# Patient Record
Sex: FEMALE | Race: WHITE | NOT HISPANIC OR LATINO | Employment: FULL TIME | URBAN - NONMETROPOLITAN AREA
[De-identification: names, ages, dates, MRNs, and addresses within clinical notes are randomized per-mention and may not be internally consistent; named-entity substitution may affect disease eponyms.]

---

## 2020-11-06 ENCOUNTER — OFFICE VISIT (OUTPATIENT)
Dept: URGENT CARE | Facility: PHYSICIAN GROUP | Age: 20
End: 2020-11-06
Payer: COMMERCIAL

## 2020-11-06 VITALS
RESPIRATION RATE: 16 BRPM | BODY MASS INDEX: 20.14 KG/M2 | HEIGHT: 64 IN | WEIGHT: 118 LBS | HEART RATE: 88 BPM | DIASTOLIC BLOOD PRESSURE: 70 MMHG | SYSTOLIC BLOOD PRESSURE: 126 MMHG | TEMPERATURE: 98.4 F | OXYGEN SATURATION: 97 %

## 2020-11-06 DIAGNOSIS — J01.40 ACUTE NON-RECURRENT PANSINUSITIS: ICD-10-CM

## 2020-11-06 PROCEDURE — 99204 OFFICE O/P NEW MOD 45 MIN: CPT | Performed by: PHYSICIAN ASSISTANT

## 2020-11-06 RX ORDER — AMOXICILLIN AND CLAVULANATE POTASSIUM 875; 125 MG/1; MG/1
1 TABLET, FILM COATED ORAL 2 TIMES DAILY
Qty: 14 TAB | Refills: 0 | Status: SHIPPED | OUTPATIENT
Start: 2020-11-06 | End: 2020-11-13

## 2020-11-06 SDOH — HEALTH STABILITY: MENTAL HEALTH: HOW OFTEN DO YOU HAVE A DRINK CONTAINING ALCOHOL?: NEVER

## 2020-11-06 ASSESSMENT — ENCOUNTER SYMPTOMS
PALPITATIONS: 0
MYALGIAS: 0
COUGH: 1
FEVER: 0
HEADACHES: 0
SHORTNESS OF BREATH: 0
WHEEZING: 0
SORE THROAT: 1
SPUTUM PRODUCTION: 1
SINUS PAIN: 1
CHILLS: 0

## 2020-11-06 NOTE — PROGRESS NOTES
"  Subjective:   Freya Parish is a 20 y.o. female who presents today with   Chief Complaint   Patient presents with   • Cough     x 2 wee  , congestion , sore throat    Patient's mother is present today.  Cough  This is a new problem. The current episode started 1 to 4 weeks ago. The problem has been unchanged. The problem occurs constantly. The cough is productive of sputum. Associated symptoms include nasal congestion and a sore throat. Pertinent negatives include no chest pain, chills, ear pain, fever, headaches, myalgias, shortness of breath or wheezing. Nothing aggravates the symptoms. Treatments tried: Mucinex. The treatment provided mild relief.   No known COVID exposure.  I personally donned proper PPE throughout visit today.     PMH:  has no past medical history on file.  MEDS:   Current Outpatient Medications:   •  amoxicillin-clavulanate (AUGMENTIN) 875-125 MG Tab, Take 1 Tab by mouth 2 times a day for 7 days., Disp: 14 Tab, Rfl: 0  ALLERGIES: No Known Allergies  SURGHX: No past surgical history on file.  SOCHX:  reports that she has never smoked. She has never used smokeless tobacco. She reports that she does not drink alcohol or use drugs.  FH: Reviewed with patient, not pertinent to this visit.     Review of Systems   Constitutional: Negative for chills, fever and malaise/fatigue.   HENT: Positive for congestion, sinus pain and sore throat. Negative for ear pain.    Respiratory: Positive for cough and sputum production. Negative for shortness of breath and wheezing.    Cardiovascular: Negative for chest pain and palpitations.   Musculoskeletal: Negative for myalgias.   Neurological: Negative for headaches.   All other systems reviewed and are negative.     Objective:   /70   Pulse 88   Temp 36.9 °C (98.4 °F) (Temporal)   Resp 16   Ht 1.626 m (5' 4\")   Wt 53.5 kg (118 lb)   LMP 11/30/2019   SpO2 97%   BMI 20.25 kg/m²   Physical Exam  Vitals signs and nursing note reviewed. "   Constitutional:       General: She is not in acute distress.     Appearance: Normal appearance. She is well-developed. She is not ill-appearing or toxic-appearing.   HENT:      Head: Normocephalic and atraumatic.      Right Ear: Hearing, tympanic membrane and ear canal normal.      Left Ear: Hearing, tympanic membrane and ear canal normal.      Nose: Congestion present.      Right Sinus: Maxillary sinus tenderness and frontal sinus tenderness present.      Left Sinus: Maxillary sinus tenderness and frontal sinus tenderness present.      Mouth/Throat:      Mouth: Mucous membranes are moist.      Pharynx: No oropharyngeal exudate or posterior oropharyngeal erythema.   Eyes:      Conjunctiva/sclera: Conjunctivae normal.   Cardiovascular:      Rate and Rhythm: Normal rate and regular rhythm.      Heart sounds: Normal heart sounds.   Pulmonary:      Effort: Pulmonary effort is normal.      Breath sounds: Normal breath sounds. No wheezing, rhonchi or rales.      Comments: Congested cough on exam  Musculoskeletal:      Comments: Normal movement in all 4 extremities   Skin:     General: Skin is warm and dry.   Neurological:      Mental Status: She is alert.      Coordination: Coordination normal.   Psychiatric:         Mood and Affect: Mood normal.       Assessment/Plan:   Assessment    1. Acute non-recurrent pansinusitis  - amoxicillin-clavulanate (AUGMENTIN) 875-125 MG Tab; Take 1 Tab by mouth 2 times a day for 7 days.  Dispense: 14 Tab; Refill: 0  Offered Covid test but patient's mother declines today.  Discussed CDC guidelines including any new fevers or chills that would definitely require COVID testing at that time.  Encourage sinus rinse and continued use of Mucinex.  Discussed that we cannot rule out Covid without testing today but they would like to not have testing done.  Differential diagnosis, natural history, supportive care, and indications for immediate follow-up discussed.   Patient given instructions and  understanding of medications and treatment.    If not improving in 3-5 days, F/U with PCP or return to UC if symptoms worsen.    Patient agreeable to plan.        Please note that this dictation was created using voice recognition software. I have made every reasonable attempt to correct obvious errors, but I expect that there are errors of grammar and possibly content that I did not discover before finalizing the note.    Mickey Aparicio PA-C

## 2020-11-06 NOTE — LETTER
November 6, 2020         Patient: Freya Parish   YOB: 2000   Date of Visit: 11/6/2020           To Whom it May Concern:    Freya Parish was seen in my clinic on 11/6/2020.  Patient was started on an antibiotic today for sinus infection.  Discussed CDC guidelines with any new symptoms including but not limited to any fevers or chills she should return for Covid testing at that time.    If you have any questions or concerns, please don't hesitate to call.        Sincerely,           Mickey Aparicio P.A.-C.  Electronically Signed

## 2023-03-08 ENCOUNTER — HOSPITAL ENCOUNTER (OUTPATIENT)
Dept: LAB | Facility: MEDICAL CENTER | Age: 23
End: 2023-03-08
Attending: STUDENT IN AN ORGANIZED HEALTH CARE EDUCATION/TRAINING PROGRAM
Payer: COMMERCIAL

## 2023-03-08 ENCOUNTER — OFFICE VISIT (OUTPATIENT)
Dept: MEDICAL GROUP | Facility: PHYSICIAN GROUP | Age: 23
End: 2023-03-08
Payer: COMMERCIAL

## 2023-03-08 ENCOUNTER — HOSPITAL ENCOUNTER (OUTPATIENT)
Dept: RADIOLOGY | Facility: MEDICAL CENTER | Age: 23
End: 2023-03-08
Attending: STUDENT IN AN ORGANIZED HEALTH CARE EDUCATION/TRAINING PROGRAM
Payer: COMMERCIAL

## 2023-03-08 VITALS
HEIGHT: 64 IN | SYSTOLIC BLOOD PRESSURE: 102 MMHG | DIASTOLIC BLOOD PRESSURE: 70 MMHG | OXYGEN SATURATION: 100 % | HEART RATE: 102 BPM | WEIGHT: 122 LBS | BODY MASS INDEX: 20.83 KG/M2 | RESPIRATION RATE: 12 BRPM | TEMPERATURE: 98.7 F

## 2023-03-08 DIAGNOSIS — F32.2 CURRENT SEVERE EPISODE OF MAJOR DEPRESSIVE DISORDER WITHOUT PSYCHOTIC FEATURES WITHOUT PRIOR EPISODE (HCC): ICD-10-CM

## 2023-03-08 DIAGNOSIS — R11.2 NAUSEA AND VOMITING, UNSPECIFIED VOMITING TYPE: ICD-10-CM

## 2023-03-08 DIAGNOSIS — R10.11 RUQ PAIN: ICD-10-CM

## 2023-03-08 DIAGNOSIS — Z76.89 ENCOUNTER TO ESTABLISH CARE: ICD-10-CM

## 2023-03-08 DIAGNOSIS — Z23 NEED FOR VACCINATION: ICD-10-CM

## 2023-03-08 DIAGNOSIS — R23.3 PETECHIAE: ICD-10-CM

## 2023-03-08 LAB — TSH SERPL DL<=0.005 MIU/L-ACNC: 0.79 UIU/ML (ref 0.38–5.33)

## 2023-03-08 PROCEDURE — 84443 ASSAY THYROID STIM HORMONE: CPT

## 2023-03-08 PROCEDURE — 36415 COLL VENOUS BLD VENIPUNCTURE: CPT

## 2023-03-08 PROCEDURE — 90471 IMMUNIZATION ADMIN: CPT | Performed by: STUDENT IN AN ORGANIZED HEALTH CARE EDUCATION/TRAINING PROGRAM

## 2023-03-08 PROCEDURE — 76705 ECHO EXAM OF ABDOMEN: CPT

## 2023-03-08 PROCEDURE — 99204 OFFICE O/P NEW MOD 45 MIN: CPT | Mod: 25 | Performed by: STUDENT IN AN ORGANIZED HEALTH CARE EDUCATION/TRAINING PROGRAM

## 2023-03-08 PROCEDURE — 90715 TDAP VACCINE 7 YRS/> IM: CPT | Performed by: STUDENT IN AN ORGANIZED HEALTH CARE EDUCATION/TRAINING PROGRAM

## 2023-03-08 RX ORDER — ONDANSETRON 4 MG/1
4 TABLET, ORALLY DISINTEGRATING ORAL EVERY 6 HOURS PRN
Qty: 15 TABLET | Refills: 0 | Status: SHIPPED | OUTPATIENT
Start: 2023-03-08 | End: 2023-04-12

## 2023-03-08 RX ORDER — OMEPRAZOLE 20 MG/1
20 CAPSULE, DELAYED RELEASE ORAL DAILY
Qty: 30 CAPSULE | Refills: 1 | Status: SHIPPED | OUTPATIENT
Start: 2023-03-08 | End: 2023-04-03 | Stop reason: SDUPTHER

## 2023-03-08 ASSESSMENT — PATIENT HEALTH QUESTIONNAIRE - PHQ9
SUM OF ALL RESPONSES TO PHQ QUESTIONS 1-9: 20
CLINICAL INTERPRETATION OF PHQ2 SCORE: 6
5. POOR APPETITE OR OVEREATING: 2 - MORE THAN HALF THE DAYS

## 2023-03-08 NOTE — PROGRESS NOTES
Subjective:     CC:  establish care    HISTORY OF THE PRESENT ILLNESS: Patient is a 22 y.o. female here today with her mother to establish care.    Current severe episode of major depressive disorder without psychotic features without prior episode (HCC)  Patient reports depression for the past 5 years.  Patient has never done therapy or tried medication.  Patient used to cut her hands with paper and last did it a few months ago.  Patient reports past suicidal attempt in a car and also thought about cutting her neck.  Patient currently denies suicidal ideations.  Patient is agreeable to see psychiatry.        3/8/2023     2:20 PM   PHQ-9 Screening   Little interest or pleasure in doing things 3 - nearly every day   Feeling down, depressed, or hopeless 3 - nearly every day   Trouble falling or staying asleep, or sleeping too much 3 - nearly every day   Feeling tired or having little energy 3 - nearly every day   Poor appetite or overeating 2 - more than half the days   Feeling bad about yourself - or that you are a failure or have let yourself or your family down 3 - nearly every day   Trouble concentrating on things, such as reading the newspaper or watching television 0 - not at all   Moving or speaking so slowly that other people could have noticed. Or the opposite - being so fidgety or restless that you have been moving around a lot more than usual 2 - more than half the days   Thoughts that you would be better off dead, or of hurting yourself in some way 1 - several days   PHQ-2 Total Score 6   PHQ-9 Total Score 20       Interpretation of PHQ-9 Total Score   Score Severity   1-4 No Depression   5-9 Mild Depression   10-14 Moderate Depression   15-19 Moderately Severe Depression   20-27 Severe Depression    RUQ pain  Patient complains of RUQ abdominal pain with associated vomiting of bile 1-7x daily since October 2022.  Pain occasionally radiates to the epigastric region.  Patient also reports occasional loose  stools.  Patient reports salty foods and slushies help relieve symptoms.  Patient reports overall healthy diet and has not noticed any association with food or fried/fatty foods.  Patient reports acid reflux for which she tried Tums and Mylanta but it did not help with RUQ pain or nausea/vomiting.    Petechiae  Patient reports she first noticed small purple dots on her arms 2 years ago.  Patient denies using any OTC medications or herbal supplements.      Health Maintenance: Completed    Allergies   Allergen Reactions    Acetaminophen     Guggulipid-Black Pepper      All pepper    Latex     Bowen Flavor     Peanut (Diagnostic)     Tomato     Tree Nuts Food Allergy      Patient Active Problem List   Diagnosis    Current severe episode of major depressive disorder without psychotic features without prior episode (HCC)    RUQ pain    Petechiae     Current Outpatient Medications   Medication Sig Dispense Refill    omeprazole (PRILOSEC) 20 MG delayed-release capsule Take 1 Capsule by mouth every day. 30 Capsule 1    ondansetron (ZOFRAN ODT) 4 MG TABLET DISPERSIBLE Take 1 Tablet by mouth every 6 hours as needed for Nausea/Vomiting. 15 Tablet 0     No current facility-administered medications for this visit.     History reviewed. No pertinent surgical history.   Social History     Socioeconomic History    Marital status: Single     Spouse name: Not on file    Number of children: 0    Years of education: Not on file    Highest education level: Not on file   Occupational History    Occupation: [employer] manager   Tobacco Use    Smoking status: Never    Smokeless tobacco: Never   Vaping Use    Vaping Use: Never used   Substance and Sexual Activity    Alcohol use: Never    Drug use: Never    Sexual activity: Never   Other Topics Concern    Not on file   Social History Narrative    Not on file     Social Determinants of Health     Financial Resource Strain: Not on file   Food Insecurity: Not on file   Transportation  "Needs: Not on file   Physical Activity: Not on file   Stress: Not on file   Social Connections: Not on file   Intimate Partner Violence: Not on file   Housing Stability: Not on file     Family History   Problem Relation Age of Onset    Cancer Maternal Grandfather     Heart Disease Paternal Grandfather     Diabetes Paternal Grandfather     Breast Cancer Other     Ovarian Cancer Neg Hx     Tubal Cancer Neg Hx     Peritoneal Cancer Neg Hx     Colorectal Cancer Neg Hx          ROS:     Constitutional:  Negative for chills, fever, fatigue, weight loss.  HEENT:  Negative for blurred vision, hearing loss, sore throat.    Respiratory:  Negative for cough, sputum production and shortness of breath.  Cardiovascular:  Negative for chest pain, palpitations and leg swelling.  Gastrointestinal:  Positive for abdominal pain, nausea, and vomiting but negative for blood in stool, constipation, and diarrhea.   Musculoskeletal:  Negative for back pain, falls, joint pain and neck pain.   Skin:  Negative for rash.   Neurological:  Negative for dizziness, seizures, weakness and headaches.   Endo/Heme/Allergies:  Does not bruise/bleed easily.   Psychiatric/Behavioral:  Positive for depression but negative for anxiety and suicidal thoughts.      Objective:     Exam: /70   Pulse (!) 102   Temp 37.1 °C (98.7 °F) (Temporal)   Resp 12   Ht 1.613 m (5' 3.5\")   Wt 55.3 kg (122 lb)   SpO2 100%  Body mass index is 21.27 kg/m².    Gen: Alert and oriented, no acute distress.  Eyes:  PERRL, conjunctivae clear, lids normal.   Neck: Neck is supple, trachea middle, no palpable lymphadenopathy or thyromegaly.  Lungs: Normal effort, CTAB, no wheezing / rhonchi / rales.  CV: RRR, normal S1 and S2, no murmurs.  GI:  Abdomen soft and non-distended with RUQ tenderness to palpatio.  Normal bowel sounds in all quadrants.  MSK:  Normal ROM.  Ext: No clubbing, cyanosis, or edema.  Skin:  Warm and dry with few petechia dispersed on arms.  Neuro: AAO x " 3, no acute focal deficits.  Psych: Normal affect and mood.      Assessment & Plan:   22 y.o. female with the following -    1. Encounter to establish care  Patient presents today to establish care.  History was discussed in detail with the patient and her mother.  Annual labs ordered.  Patient declined all vaccines except Tdap.  Patient is currently not sexually active and declined Pap smear.  - CBC WITH DIFFERENTIAL; Future  - Comp Metabolic Panel; Future    2. Current severe episode of major depressive disorder without psychotic features without prior episode (HCC)  Chronic, uncontrolled.  PHQ-9 score 20 (severe depression).  History of suicidal attempt in the past and cutting with paper but currently denies suicidal ideations.  TSH ordered.  Given referral to psychiatry for further evaluation and treatment.  - Referral to Psychiatry  - TSH WITH REFLEX TO FT4; Future    3. RUQ pain  Chronic, uncontrolled.  LFTs ordered.  RUQ US ordered for further evaluation.  - US-RUQ; Future  - Comp Metabolic Panel; Future    4. Nausea and vomiting, unspecified vomiting type  Chronic, uncontrolled.  Patient complains of RUQ abdominal pain with associated nausea and vomiting of bile.  RUQ US ordered for further evaluation.  Annual labs including lipase ordered.  We will do a 1 month trial of omeprazole 20 mg daily.  Also prescribed Zofran as needed.  - US-RUQ; Future  - omeprazole (PRILOSEC) 20 MG delayed-release capsule; Take 1 Capsule by mouth every day.  Dispense: 30 Capsule; Refill: 1  - ondansetron (ZOFRAN ODT) 4 MG TABLET DISPERSIBLE; Take 1 Tablet by mouth every 6 hours as needed for Nausea/Vomiting.  Dispense: 15 Tablet; Refill: 0  - LIPASE; Future    5. Petechiae  Chronic.  Labs ordered.  - CBC WITH DIFFERENTIAL; Future    6. Need for vaccination  - Tdap =>8yo IM          Return in about 1 month (around 4/8/2023) for Discuss labs, Discuss imaging.    Please note that this dictation was created using voice recognition  software. I have made every reasonable attempt to correct obvious errors, but I expect that there are errors of grammar and possibly content that I did not discover before finalizing the note.

## 2023-03-09 NOTE — ASSESSMENT & PLAN NOTE
Patient reports she first noticed small purple dots on her arms 2 years ago.  Patient denies using any OTC medications or herbal supplements.

## 2023-03-09 NOTE — ASSESSMENT & PLAN NOTE
Patient complains of RUQ abdominal pain with associated vomiting of bile 1-7x daily since October 2022.  Pain occasionally radiates to the epigastric region.  Patient also reports occasional loose stools.  Patient reports salty foods and slushies help relieve symptoms.  Patient reports overall healthy diet and has not noticed any association with food or fried/fatty foods.  Patient reports acid reflux for which she tried Tums and Mylanta but it did not help with RUQ pain or nausea/vomiting.

## 2023-03-27 ENCOUNTER — HOSPITAL ENCOUNTER (OUTPATIENT)
Dept: LAB | Facility: MEDICAL CENTER | Age: 23
End: 2023-03-27
Attending: STUDENT IN AN ORGANIZED HEALTH CARE EDUCATION/TRAINING PROGRAM
Payer: COMMERCIAL

## 2023-03-27 DIAGNOSIS — R23.3 PETECHIAE: ICD-10-CM

## 2023-03-27 DIAGNOSIS — R11.2 NAUSEA AND VOMITING, UNSPECIFIED VOMITING TYPE: ICD-10-CM

## 2023-03-27 DIAGNOSIS — Z76.89 ENCOUNTER TO ESTABLISH CARE: ICD-10-CM

## 2023-03-27 DIAGNOSIS — R10.11 RUQ PAIN: ICD-10-CM

## 2023-03-27 LAB
ALBUMIN SERPL BCP-MCNC: 4.5 G/DL (ref 3.2–4.9)
ALBUMIN/GLOB SERPL: 1.4 G/DL
ALP SERPL-CCNC: 70 U/L (ref 30–99)
ALT SERPL-CCNC: 20 U/L (ref 2–50)
ANION GAP SERPL CALC-SCNC: 10 MMOL/L (ref 7–16)
AST SERPL-CCNC: 22 U/L (ref 12–45)
BASOPHILS # BLD AUTO: 0.5 % (ref 0–1.8)
BASOPHILS # BLD: 0.03 K/UL (ref 0–0.12)
BILIRUB SERPL-MCNC: 0.3 MG/DL (ref 0.1–1.5)
BUN SERPL-MCNC: 15 MG/DL (ref 8–22)
CALCIUM ALBUM COR SERPL-MCNC: 9.1 MG/DL (ref 8.5–10.5)
CALCIUM SERPL-MCNC: 9.5 MG/DL (ref 8.5–10.5)
CHLORIDE SERPL-SCNC: 104 MMOL/L (ref 96–112)
CO2 SERPL-SCNC: 23 MMOL/L (ref 20–33)
CREAT SERPL-MCNC: 0.59 MG/DL (ref 0.5–1.4)
EOSINOPHIL # BLD AUTO: 0.1 K/UL (ref 0–0.51)
EOSINOPHIL NFR BLD: 1.8 % (ref 0–6.9)
ERYTHROCYTE [DISTWIDTH] IN BLOOD BY AUTOMATED COUNT: 42.8 FL (ref 35.9–50)
GFR SERPLBLD CREATININE-BSD FMLA CKD-EPI: 130 ML/MIN/1.73 M 2
GLOBULIN SER CALC-MCNC: 3.3 G/DL (ref 1.9–3.5)
GLUCOSE SERPL-MCNC: 87 MG/DL (ref 65–99)
HCT VFR BLD AUTO: 42.5 % (ref 37–47)
HGB BLD-MCNC: 13.7 G/DL (ref 12–16)
IMM GRANULOCYTES # BLD AUTO: 0 K/UL (ref 0–0.11)
IMM GRANULOCYTES NFR BLD AUTO: 0 % (ref 0–0.9)
LIPASE SERPL-CCNC: 28 U/L (ref 11–82)
LYMPHOCYTES # BLD AUTO: 2.12 K/UL (ref 1–4.8)
LYMPHOCYTES NFR BLD: 37.3 % (ref 22–41)
MCH RBC QN AUTO: 28 PG (ref 27–33)
MCHC RBC AUTO-ENTMCNC: 32.2 G/DL (ref 33.6–35)
MCV RBC AUTO: 86.7 FL (ref 81.4–97.8)
MONOCYTES # BLD AUTO: 0.4 K/UL (ref 0–0.85)
MONOCYTES NFR BLD AUTO: 7 % (ref 0–13.4)
NEUTROPHILS # BLD AUTO: 3.04 K/UL (ref 2–7.15)
NEUTROPHILS NFR BLD: 53.4 % (ref 44–72)
NRBC # BLD AUTO: 0 K/UL
NRBC BLD-RTO: 0 /100 WBC
PLATELET # BLD AUTO: 342 K/UL (ref 164–446)
PMV BLD AUTO: 10.1 FL (ref 9–12.9)
POTASSIUM SERPL-SCNC: 4.5 MMOL/L (ref 3.6–5.5)
PROT SERPL-MCNC: 7.8 G/DL (ref 6–8.2)
RBC # BLD AUTO: 4.9 M/UL (ref 4.2–5.4)
SODIUM SERPL-SCNC: 137 MMOL/L (ref 135–145)
WBC # BLD AUTO: 5.7 K/UL (ref 4.8–10.8)

## 2023-03-27 PROCEDURE — 80053 COMPREHEN METABOLIC PANEL: CPT

## 2023-03-27 PROCEDURE — 85025 COMPLETE CBC W/AUTO DIFF WBC: CPT

## 2023-03-27 PROCEDURE — 83690 ASSAY OF LIPASE: CPT

## 2023-03-27 PROCEDURE — 36415 COLL VENOUS BLD VENIPUNCTURE: CPT

## 2023-04-03 DIAGNOSIS — R11.2 NAUSEA AND VOMITING, UNSPECIFIED VOMITING TYPE: ICD-10-CM

## 2023-04-03 RX ORDER — OMEPRAZOLE 20 MG/1
20 CAPSULE, DELAYED RELEASE ORAL DAILY
Qty: 30 CAPSULE | Refills: 0 | Status: SHIPPED | OUTPATIENT
Start: 2023-04-03 | End: 2023-04-12

## 2023-04-03 NOTE — TELEPHONE ENCOUNTER
Requested Prescriptions     Pending Prescriptions Disp Refills   • omeprazole (PRILOSEC) 20 MG delayed-release capsule 30 Capsule 0     Sig: Take 1 Capsule by mouth every day.       Sharonda Craig A.P.R.N.

## 2023-04-12 ENCOUNTER — OFFICE VISIT (OUTPATIENT)
Dept: MEDICAL GROUP | Facility: PHYSICIAN GROUP | Age: 23
End: 2023-04-12
Payer: COMMERCIAL

## 2023-04-12 VITALS
HEIGHT: 64 IN | DIASTOLIC BLOOD PRESSURE: 72 MMHG | RESPIRATION RATE: 13 BRPM | OXYGEN SATURATION: 99 % | BODY MASS INDEX: 20.83 KG/M2 | TEMPERATURE: 98.4 F | WEIGHT: 122 LBS | SYSTOLIC BLOOD PRESSURE: 100 MMHG | HEART RATE: 94 BPM

## 2023-04-12 DIAGNOSIS — F32.2 CURRENT SEVERE EPISODE OF MAJOR DEPRESSIVE DISORDER WITHOUT PSYCHOTIC FEATURES WITHOUT PRIOR EPISODE (HCC): ICD-10-CM

## 2023-04-12 DIAGNOSIS — R55 SYNCOPE, UNSPECIFIED SYNCOPE TYPE: ICD-10-CM

## 2023-04-12 DIAGNOSIS — R10.11 RUQ PAIN: ICD-10-CM

## 2023-04-12 DIAGNOSIS — R11.14 BILIOUS VOMITING WITH NAUSEA: ICD-10-CM

## 2023-04-12 PROCEDURE — 99214 OFFICE O/P EST MOD 30 MIN: CPT | Performed by: STUDENT IN AN ORGANIZED HEALTH CARE EDUCATION/TRAINING PROGRAM

## 2023-04-12 ASSESSMENT — FIBROSIS 4 INDEX: FIB4 SCORE: 0.32

## 2023-04-12 NOTE — ASSESSMENT & PLAN NOTE
Patient reports 1-2 syncopal episodes weekly for the past couple months.  Patient reports associated dizziness, palpitations, feeling hot, anxious during episodes and occasionally she has RUQ pain or vomiting.  Patient reports syncopal episodes were witnessed and last 5 minutes.  Patient reports she often works 8-10 hours without eating.  Patient reports syncopal episodes at work but she was never sent to the ER.

## 2023-04-12 NOTE — ASSESSMENT & PLAN NOTE
At the last visit patient reported RUQ abdominal pain that radiates to the epigastric region since October 2022 with associated vomiting of bile 1-7x daily and occasional loose stools.  At the last visit patient was prescribed Omeprazole 20 mg daily.  Lipase and RUQ US normal.  Patient reports Omeprazole and Zofran did not help.

## 2023-04-12 NOTE — PROGRESS NOTES
"Subjective:     Chief Complaint   Patient presents with    Follow-Up     Dizziness/nausea   Imaging and labs          HPI:   Freya presents today with     RUQ pain  At the last visit patient reported RUQ abdominal pain that radiates to the epigastric region since October 2022 with associated vomiting of bile 1-7x daily and occasional loose stools.  At the last visit patient was prescribed Omeprazole 20 mg daily.  Lipase and RUQ US normal.  Patient reports Omeprazole and Zofran did not help.    Current severe episode of major depressive disorder without psychotic features without prior episode (HCC)  Given referral to Psychiatry at the last visit and given phone # today to schedule appointment.    Syncope  Patient reports 1-2 syncopal episodes weekly for the past couple months.  Patient reports associated dizziness, palpitations, feeling hot, anxious during episodes and occasionally she has RUQ pain or vomiting.  Patient reports syncopal episodes were witnessed and last 5 minutes.  Patient reports she often works 8-10 hours without eating.  Patient reports syncopal episodes at work but she was never sent to the ER.      Health Maintenance: Completed    ROS:  Negative except as stated above.    Objective:     Exam:  /72   Pulse 94   Temp 36.9 °C (98.4 °F) (Temporal)   Resp 13   Ht 1.613 m (5' 3.5\")   Wt 55.3 kg (122 lb)   LMP 03/20/2023   SpO2 99%   BMI 21.27 kg/m²  Body mass index is 21.27 kg/m².    Physical Exam    Gen: Alert and oriented, no acute distress.  Lungs: Normal effort, CTAB, no wheezing / rhonchi / rales.  CV: RRR, normal S1 and S2, no murmurs.    Labs:   Hospital Outpatient Visit on 03/27/2023   Component Date Value Ref Range Status    Lipase 03/27/2023 28  11 - 82 U/L Final    Sodium 03/27/2023 137  135 - 145 mmol/L Final    Potassium 03/27/2023 4.5  3.6 - 5.5 mmol/L Final    Chloride 03/27/2023 104  96 - 112 mmol/L Final    Co2 03/27/2023 23  20 - 33 mmol/L Final    Anion Gap " 03/27/2023 10.0  7.0 - 16.0 Final    Glucose 03/27/2023 87  65 - 99 mg/dL Final    Bun 03/27/2023 15  8 - 22 mg/dL Final    Creatinine 03/27/2023 0.59  0.50 - 1.40 mg/dL Final    Calcium 03/27/2023 9.5  8.5 - 10.5 mg/dL Final    AST(SGOT) 03/27/2023 22  12 - 45 U/L Final    ALT(SGPT) 03/27/2023 20  2 - 50 U/L Final    Alkaline Phosphatase 03/27/2023 70  30 - 99 U/L Final    Total Bilirubin 03/27/2023 0.3  0.1 - 1.5 mg/dL Final    Albumin 03/27/2023 4.5  3.2 - 4.9 g/dL Final    Total Protein 03/27/2023 7.8  6.0 - 8.2 g/dL Final    Globulin 03/27/2023 3.3  1.9 - 3.5 g/dL Final    A-G Ratio 03/27/2023 1.4  g/dL Final    WBC 03/27/2023 5.7  4.8 - 10.8 K/uL Final    RBC 03/27/2023 4.90  4.20 - 5.40 M/uL Final    Hemoglobin 03/27/2023 13.7  12.0 - 16.0 g/dL Final    Hematocrit 03/27/2023 42.5  37.0 - 47.0 % Final    MCV 03/27/2023 86.7  81.4 - 97.8 fL Final    MCH 03/27/2023 28.0  27.0 - 33.0 pg Final    MCHC 03/27/2023 32.2 (L)  33.6 - 35.0 g/dL Final    RDW 03/27/2023 42.8  35.9 - 50.0 fL Final    Platelet Count 03/27/2023 342  164 - 446 K/uL Final    MPV 03/27/2023 10.1  9.0 - 12.9 fL Final    Neutrophils-Polys 03/27/2023 53.40  44.00 - 72.00 % Final    Lymphocytes 03/27/2023 37.30  22.00 - 41.00 % Final    Monocytes 03/27/2023 7.00  0.00 - 13.40 % Final    Eosinophils 03/27/2023 1.80  0.00 - 6.90 % Final    Basophils 03/27/2023 0.50  0.00 - 1.80 % Final    Immature Granulocytes 03/27/2023 0.00  0.00 - 0.90 % Final    Nucleated RBC 03/27/2023 0.00  /100 WBC Final    Neutrophils (Absolute) 03/27/2023 3.04  2.00 - 7.15 K/uL Final    Includes immature neutrophils, if present.    Lymphs (Absolute) 03/27/2023 2.12  1.00 - 4.80 K/uL Final    Monos (Absolute) 03/27/2023 0.40  0.00 - 0.85 K/uL Final    Eos (Absolute) 03/27/2023 0.10  0.00 - 0.51 K/uL Final    Baso (Absolute) 03/27/2023 0.03  0.00 - 0.12 K/uL Final    Immature Granulocytes (abs) 03/27/2023 0.00  0.00 - 0.11 K/uL Final    NRBC (Absolute) 03/27/2023 0.00  K/uL  Final    Correct Calcium 03/27/2023 9.1  8.5 - 10.5 mg/dL Final    GFR (CKD-EPI) 03/27/2023 130  >60 mL/min/1.73 m 2 Final    Comment: Estimated Glomerular Filtration Rate is calculated using  race neutral CKD-EPI 2021 equation per NKF-ASN recommendations.           Assessment & Plan:     22 y.o. female with the following -     1. RUQ pain  2. Bilious vomiting with nausea  Chronic, uncontrolled.  Lipase WNL.  No improvement with Omeprazole and Zofran.  Patient was told she may have Celiac disease and was advised to try limiting gluten in diet.  Given referral to GI for further evaluation.  Patient was advised to increase Zofran to 8 mg as needed.  - Referral to Gastroenterology    3. Syncope, unspecified syncope type  This is a new problem.  Patient was advised to snack every 2-3 hours to prevent hypoglycemia and was given note for work.  Patient will schedule an appointment with Psychiatry for anxiety/depression.  Zio patch ordered for cardiac evaluation.  - [facility] ZIO PATCH MONITOR; Future        I spent a total of 30 minutes with record review, exam, communication with the patient, communication with other providers, and documentation of this encounter.      Return in about 2 months (around 6/12/2023) for f/u syncope.    Please note that this dictation was created using voice recognition software. I have made every reasonable attempt to correct obvious errors, but I expect that there are errors of grammar and possibly content that I did not discover before finalizing the note.

## 2023-04-12 NOTE — LETTER
April 12, 2023    To Whom It May Concern:         This is confirmation that Freya Parish attended her scheduled appointment with Mirian Lantigua M.D. on 4/12/23.  Please allow the patient to eat every 2-3 hours while at work.         If you have any questions please do not hesitate to call me at the phone number listed below.    Sincerely,          Mirian Lantigua M.D.  [phone]

## 2023-04-21 ENCOUNTER — TELEPHONE (OUTPATIENT)
Dept: HEALTH INFORMATION MANAGEMENT | Facility: OTHER | Age: 23
End: 2023-04-21
Payer: COMMERCIAL

## 2023-05-02 ENCOUNTER — NON-PROVIDER VISIT (OUTPATIENT)
Dept: CARDIOLOGY | Facility: MEDICAL CENTER | Age: 23
End: 2023-05-02
Attending: STUDENT IN AN ORGANIZED HEALTH CARE EDUCATION/TRAINING PROGRAM
Payer: COMMERCIAL

## 2023-05-02 DIAGNOSIS — R55 SYNCOPE, UNSPECIFIED SYNCOPE TYPE: ICD-10-CM

## 2023-05-02 DIAGNOSIS — I49.1 APC (ATRIAL PREMATURE CONTRACTIONS): ICD-10-CM

## 2023-05-02 DIAGNOSIS — I49.3 PVC'S (PREMATURE VENTRICULAR CONTRACTIONS): ICD-10-CM

## 2023-05-02 NOTE — PROGRESS NOTES
Message to BN 06/17/23    Home enrollment completed in the 14 day Zio XT Holter monitoring program, per Mirian Lantigua M.D. Monitor will be shipped to patient via [employer]. Pending EOS.

## 2023-05-31 ENCOUNTER — TELEPHONE (OUTPATIENT)
Dept: CARDIOLOGY | Facility: MEDICAL CENTER | Age: 23
End: 2023-05-31
Payer: COMMERCIAL

## 2023-06-19 PROCEDURE — 93248 EXT ECG>7D<15D REV&INTERPJ: CPT | Performed by: INTERNAL MEDICINE
